# Patient Record
Sex: FEMALE | URBAN - METROPOLITAN AREA
[De-identification: names, ages, dates, MRNs, and addresses within clinical notes are randomized per-mention and may not be internally consistent; named-entity substitution may affect disease eponyms.]

---

## 2021-01-05 ENCOUNTER — NEW PATIENT (OUTPATIENT)
Dept: URBAN - METROPOLITAN AREA CLINIC 64 | Facility: CLINIC | Age: 86
End: 2021-01-05
Payer: MEDICARE

## 2021-01-05 DIAGNOSIS — H52.13 MYOPIA, BILATERAL: ICD-10-CM

## 2021-01-05 PROCEDURE — 92004 COMPRE OPH EXAM NEW PT 1/>: CPT | Performed by: OPTOMETRIST

## 2021-01-05 ASSESSMENT — VISUAL ACUITY
OS: 20/40
OD: 20/25

## 2021-01-05 ASSESSMENT — INTRAOCULAR PRESSURE
OS: 8
OD: 10

## 2021-01-05 ASSESSMENT — KERATOMETRY
OS: 43.31
OD: 43.02

## 2021-02-02 ENCOUNTER — NEW PATIENT (OUTPATIENT)
Dept: URBAN - METROPOLITAN AREA CLINIC 64 | Facility: CLINIC | Age: 86
End: 2021-02-02
Payer: MEDICARE

## 2021-02-02 PROCEDURE — 99204 OFFICE O/P NEW MOD 45 MIN: CPT | Performed by: OPHTHALMOLOGY

## 2021-02-02 PROCEDURE — 92134 CPTRZ OPH DX IMG PST SGM RTA: CPT | Performed by: OPHTHALMOLOGY

## 2021-02-02 ASSESSMENT — INTRAOCULAR PRESSURE
OD: 11
OS: 10

## 2021-11-08 ENCOUNTER — OFFICE VISIT (OUTPATIENT)
Dept: URBAN - METROPOLITAN AREA CLINIC 64 | Facility: CLINIC | Age: 86
End: 2021-11-08
Payer: MEDICARE

## 2021-11-08 DIAGNOSIS — H34.8320 TRIBUTARY (BRANCH) RETINAL VEIN OCCLUSION, LEFT EYE, WITH MACULAR EDEMA: Primary | ICD-10-CM

## 2021-11-08 DIAGNOSIS — H16.223 KERATOCONJUNCTIVITIS SICCA, NOT SPECIFIED AS SJOGREN'S, BILATERAL: ICD-10-CM

## 2021-11-08 DIAGNOSIS — H52.4 PRESBYOPIA: ICD-10-CM

## 2021-11-08 PROCEDURE — 92014 COMPRE OPH EXAM EST PT 1/>: CPT | Performed by: OPTOMETRIST

## 2021-11-08 ASSESSMENT — INTRAOCULAR PRESSURE
OD: 15
OS: 13

## 2021-11-08 ASSESSMENT — VISUAL ACUITY: OD: 20/60

## 2021-11-08 ASSESSMENT — KERATOMETRY
OD: 43.21
OS: 43.33

## 2021-11-08 NOTE — IMPRESSION/PLAN
Impression: Keratoconjunctivitis sicca, bilateral and BLL ectropion. Myopia. Plan: Patient's complaint is consistent with dry eye syndrome and needs assertive care gen eye and possible plastic surgery for lids.

## 2021-11-08 NOTE — IMPRESSION/PLAN
Impression: Tributary (branch) retinal vein occlusion, left eye, with macular edema: [id number]. Plan: Discussed diagnosis in detail with patient. Discussed risks and benefits and patient understands. Consult recommended [[facility]].